# Patient Record
Sex: MALE | Race: WHITE | Employment: STUDENT | URBAN - METROPOLITAN AREA
[De-identification: names, ages, dates, MRNs, and addresses within clinical notes are randomized per-mention and may not be internally consistent; named-entity substitution may affect disease eponyms.]

---

## 2024-07-19 ENCOUNTER — ANESTHESIA EVENT (OUTPATIENT)
Dept: ENDOSCOPY | Age: 49
End: 2024-07-19
Payer: COMMERCIAL

## 2024-07-22 ENCOUNTER — ANESTHESIA (OUTPATIENT)
Dept: ENDOSCOPY | Age: 49
End: 2024-07-22
Payer: COMMERCIAL

## 2024-07-22 ENCOUNTER — HOSPITAL ENCOUNTER (OUTPATIENT)
Age: 49
Setting detail: OUTPATIENT SURGERY
Discharge: HOME | End: 2024-07-22
Attending: INTERNAL MEDICINE | Admitting: INTERNAL MEDICINE
Payer: COMMERCIAL

## 2024-07-22 VITALS
BODY MASS INDEX: 27.43 KG/M2 | OXYGEN SATURATION: 96 % | HEART RATE: 60 BPM | SYSTOLIC BLOOD PRESSURE: 141 MMHG | DIASTOLIC BLOOD PRESSURE: 95 MMHG | WEIGHT: 207 LBS | RESPIRATION RATE: 16 BRPM | HEIGHT: 73 IN | TEMPERATURE: 97.3 F

## 2024-07-22 DIAGNOSIS — Z12.11 SPECIAL SCREENING FOR MALIGNANT NEOPLASMS, COLON: ICD-10-CM

## 2024-07-22 PROCEDURE — 3700000000 HC ANESTHESIA ATTENDED CARE: Performed by: INTERNAL MEDICINE

## 2024-07-22 PROCEDURE — 7100000010 HC PHASE II RECOVERY - FIRST 15 MIN: Performed by: INTERNAL MEDICINE

## 2024-07-22 PROCEDURE — 3700000001 HC ADD 15 MINUTES (ANESTHESIA): Performed by: INTERNAL MEDICINE

## 2024-07-22 PROCEDURE — 2580000003 HC RX 258: Performed by: ANESTHESIOLOGY

## 2024-07-22 PROCEDURE — 6360000002 HC RX W HCPCS: Performed by: NURSE ANESTHETIST, CERTIFIED REGISTERED

## 2024-07-22 PROCEDURE — 88305 TISSUE EXAM BY PATHOLOGIST: CPT

## 2024-07-22 PROCEDURE — 2709999900 HC NON-CHARGEABLE SUPPLY: Performed by: INTERNAL MEDICINE

## 2024-07-22 PROCEDURE — 2500000003 HC RX 250 WO HCPCS: Performed by: NURSE ANESTHETIST, CERTIFIED REGISTERED

## 2024-07-22 PROCEDURE — 3609010600 HC COLONOSCOPY POLYPECTOMY SNARE/COLD BIOPSY: Performed by: INTERNAL MEDICINE

## 2024-07-22 PROCEDURE — 7100000011 HC PHASE II RECOVERY - ADDTL 15 MIN: Performed by: INTERNAL MEDICINE

## 2024-07-22 RX ORDER — OXYCODONE HYDROCHLORIDE 5 MG/1
5 TABLET ORAL PRN
Status: CANCELLED | OUTPATIENT
Start: 2024-07-22 | End: 2024-07-22

## 2024-07-22 RX ORDER — MEPERIDINE HYDROCHLORIDE 25 MG/ML
12.5 INJECTION INTRAMUSCULAR; INTRAVENOUS; SUBCUTANEOUS EVERY 5 MIN PRN
Status: CANCELLED | OUTPATIENT
Start: 2024-07-22

## 2024-07-22 RX ORDER — PROPOFOL 10 MG/ML
INJECTION, EMULSION INTRAVENOUS PRN
Status: DISCONTINUED | OUTPATIENT
Start: 2024-07-22 | End: 2024-07-22 | Stop reason: SDUPTHER

## 2024-07-22 RX ORDER — ONDANSETRON 2 MG/ML
4 INJECTION INTRAMUSCULAR; INTRAVENOUS
Status: CANCELLED | OUTPATIENT
Start: 2024-07-22

## 2024-07-22 RX ORDER — SODIUM CHLORIDE 9 MG/ML
INJECTION, SOLUTION INTRAVENOUS PRN
Status: CANCELLED | OUTPATIENT
Start: 2024-07-22

## 2024-07-22 RX ORDER — LIDOCAINE HYDROCHLORIDE 10 MG/ML
0.3 INJECTION, SOLUTION EPIDURAL; INFILTRATION; INTRACAUDAL; PERINEURAL
Status: DISCONTINUED | OUTPATIENT
Start: 2024-07-22 | End: 2024-07-22 | Stop reason: HOSPADM

## 2024-07-22 RX ORDER — SODIUM CHLORIDE 0.9 % (FLUSH) 0.9 %
5-40 SYRINGE (ML) INJECTION EVERY 12 HOURS SCHEDULED
Status: DISCONTINUED | OUTPATIENT
Start: 2024-07-22 | End: 2024-07-22 | Stop reason: HOSPADM

## 2024-07-22 RX ORDER — CETIRIZINE HYDROCHLORIDE 10 MG/1
10 TABLET ORAL DAILY
COMMUNITY

## 2024-07-22 RX ORDER — NALOXONE HYDROCHLORIDE 0.4 MG/ML
INJECTION, SOLUTION INTRAMUSCULAR; INTRAVENOUS; SUBCUTANEOUS PRN
Status: CANCELLED | OUTPATIENT
Start: 2024-07-22

## 2024-07-22 RX ORDER — SODIUM CHLORIDE 0.9 % (FLUSH) 0.9 %
5-40 SYRINGE (ML) INJECTION EVERY 12 HOURS SCHEDULED
Status: CANCELLED | OUTPATIENT
Start: 2024-07-22

## 2024-07-22 RX ORDER — DIPHENHYDRAMINE HYDROCHLORIDE 50 MG/ML
12.5 INJECTION INTRAMUSCULAR; INTRAVENOUS
Status: CANCELLED | OUTPATIENT
Start: 2024-07-22 | End: 2024-07-23

## 2024-07-22 RX ORDER — LIDOCAINE HYDROCHLORIDE 20 MG/ML
INJECTION, SOLUTION INFILTRATION; PERINEURAL PRN
Status: DISCONTINUED | OUTPATIENT
Start: 2024-07-22 | End: 2024-07-22 | Stop reason: SDUPTHER

## 2024-07-22 RX ORDER — OXYCODONE HYDROCHLORIDE 5 MG/1
10 TABLET ORAL PRN
Status: CANCELLED | OUTPATIENT
Start: 2024-07-22 | End: 2024-07-22

## 2024-07-22 RX ORDER — SODIUM CHLORIDE 9 MG/ML
INJECTION, SOLUTION INTRAVENOUS PRN
Status: DISCONTINUED | OUTPATIENT
Start: 2024-07-22 | End: 2024-07-22 | Stop reason: HOSPADM

## 2024-07-22 RX ORDER — SODIUM CHLORIDE 0.9 % (FLUSH) 0.9 %
5-40 SYRINGE (ML) INJECTION PRN
Status: CANCELLED | OUTPATIENT
Start: 2024-07-22

## 2024-07-22 RX ORDER — SODIUM CHLORIDE, SODIUM LACTATE, POTASSIUM CHLORIDE, CALCIUM CHLORIDE 600; 310; 30; 20 MG/100ML; MG/100ML; MG/100ML; MG/100ML
INJECTION, SOLUTION INTRAVENOUS CONTINUOUS
Status: DISCONTINUED | OUTPATIENT
Start: 2024-07-22 | End: 2024-07-22 | Stop reason: HOSPADM

## 2024-07-22 RX ORDER — SODIUM CHLORIDE 0.9 % (FLUSH) 0.9 %
5-40 SYRINGE (ML) INJECTION PRN
Status: DISCONTINUED | OUTPATIENT
Start: 2024-07-22 | End: 2024-07-22 | Stop reason: HOSPADM

## 2024-07-22 RX ORDER — LABETALOL HYDROCHLORIDE 5 MG/ML
5 INJECTION, SOLUTION INTRAVENOUS EVERY 10 MIN PRN
Status: CANCELLED | OUTPATIENT
Start: 2024-07-22

## 2024-07-22 RX ADMIN — LIDOCAINE HYDROCHLORIDE 100 MG: 20 INJECTION, SOLUTION INFILTRATION; PERINEURAL at 08:46

## 2024-07-22 RX ADMIN — SODIUM CHLORIDE, POTASSIUM CHLORIDE, SODIUM LACTATE AND CALCIUM CHLORIDE: 600; 310; 30; 20 INJECTION, SOLUTION INTRAVENOUS at 08:46

## 2024-07-22 RX ADMIN — PROPOFOL 400 MG: 10 INJECTION, EMULSION INTRAVENOUS at 08:46

## 2024-07-22 NOTE — ANESTHESIA POSTPROCEDURE EVALUATION
Department of Anesthesiology  Postprocedure Note    Patient: Duran Bales  MRN: [id number]  YOB: 1975  Date of evaluation: 7/22/2024    Procedure Summary       Date: 07/22/24 Room / Location: [address]    Anesthesia Start: 0844 Anesthesia Stop: 0915    Procedure: COLONOSCOPY POLYPECTOMY SNARE/BIOPSY Diagnosis:       Special screening for malignant neoplasms, colon      (Special screening for malignant neoplasms, colon [Z12.11])    Surgeons: Sury Scott MD Responsible Provider: Linda Latham MD    Anesthesia Type: TIVA ASA Status: 2            Anesthesia Type: No value filed.    Livier Phase I: Livier Score: 10    Livier Phase II: Livier Score: 10    Anesthesia Post Evaluation    Comments: Postoperative Anesthesia Note    Name:    Duran Bales  MRN:      [id number]    Patient Vitals in the past 12 hrs:  07/22/24 0941, BP:(!) 141/95, Pulse:60, Resp:16, SpO2:96 %  07/22/24 0936, BP:(!) 135/96, Temp:97.3 °F (36.3 °C), Temp src:Temporal, Pulse:62, Resp:18, SpO2:96 %  07/22/24 0930, BP:(!) 135/95, Pulse:87, Resp:16, SpO2:97 %  07/22/24 0917, BP:128/87, Temp:97.5 °F (36.4 °C), Temp src:Temporal, Pulse:72, Resp:16, SpO2:97 %  07/22/24 0739, BP:(!) 137/98, Temp:97.8 °F (36.6 °C), Temp src:Temporal, Pulse:68, Resp:16, SpO2:97 %     LABS:    CBC  No results found for: \"WBC\", \"HGB\", \"HCT\", \"PLT\"  RENAL  No results found for: \"NA\", \"K\", \"CL\", \"CO2\", \"BUN\", \"CREATININE\", \"GLUCOSE\"  COAGS  No results found for: \"PROTIME\", \"INR\", \"APTT\"    Intake & Output:  @24HRIO@    Nausea & Vomiting:  No    Level of Consciousness:  Awake    Pain Assessment:  Adequate analgesia    Anesthesia Complications:  No apparent anesthetic complications    SUMMARY      Vital signs stable  OK to discharge from Stage I post anesthesia care.  Care transferred from Anesthesiology department on discharge from perioperative area     No notable events documented.

## 2024-07-22 NOTE — H&P
Gastroenterology Note             Pre-operative History and Physical    Patient: Duran Bales  : 1975  CSN:     History Obtained From:  patient and/or guardian.     HISTORY OF PRESENT ILLNESS:    The patient is a 48 y.o. male  here for colonoscopy.     Past Medical History:    Past Medical History:   Diagnosis Date    History of hemorrhoids     Hyperlipidemia     Hypertension      Past Surgical History:    History reviewed. No pertinent surgical history.  Medications Prior to Admission:   No current facility-administered medications on file prior to encounter.     Current Outpatient Medications on File Prior to Encounter   Medication Sig Dispense Refill    cetirizine (ZYRTEC) 10 MG tablet Take 1 tablet by mouth daily      cyclobenzaprine (FLEXERIL) 10 MG tablet Take 10 mg by mouth 3 times daily as needed.   (Patient not taking: Reported on 2024)      amlodipine-benazepril (LOTREL) 5-10 MG per capsule Take 1 capsule by mouth daily.   (Patient not taking: Reported on 2024)          Allergies:  Patient has no known allergies.      Social History:   Social History     Tobacco Use    Smoking status: Former     Types: Cigarettes    Smokeless tobacco: Former     Types: Snuff   Substance Use Topics    Alcohol use: Not Currently     Comment: couple daily     Family History:   History reviewed. No pertinent family history.    PHYSICAL EXAM:      BP (!) 137/98   Pulse 68   Temp 97.8 °F (36.6 °C) (Temporal)   Resp 16   Ht 1.854 m (6' 1\")   Wt 93.9 kg (207 lb)   SpO2 97%   BMI 27.31 kg/m²  I        Heart:   RRR, normal s1s2    Lungs:  CTA bilat,  Normal effort    Abdomen:   NT, ND      ASA Grade:  ASA 2 - Patient with mild systemic disease with no functional limitations    Mallampati Class: 2          ASSESSMENT AND PLAN:    1.  Patient is a 48 y.o. male here for Colonoscopy with MAC.   2.  Procedure options, risks and benefits reviewed with patient.  Patient expresses understanding.    Suyr

## 2024-07-22 NOTE — PROGRESS NOTES
.1.  Do not eat or drink anything after 12 midnight prior to surgery.  This includes no water, chewing gum or mints, except for bowel prep complete per MD.  2.  Take the following pills with a small sip of water on the morning of surgery.  3.  Aspirin, Ibuprofen, Advil, Naproxen, Vitamin E and other Anti-inflammatory products should be stopped for 5 days before surgery or as directed by your physician.  4.  Check with your doctor regarding stopping Plavix, Coumadin, Lovenox, Fragmin or other blood thinners.  5.  Do not smoke and do not drink alcoholic beverages 24 hours prior to surgery.  This includes NA Beer.  6.  You may brush your teeth and gargle the morning of surgery.  DO NOT SWALLOW WATER.  7.  You MUST make arrangements for a responsible adult to take you home after your surgery.  You will not be allowed to leave alone or drive yourself home.  It is strongly suggested someone stay with you the first 24 hours.  Your surgery will be cancelled if you do not have a ride home.  8.  A parent/legal guardian must accompany a child scheduled for surgery and plan to stay at the hospital until the child is discharged.  Please do not bring other children with you.  9.  Please wear simple, loose fitting clothing to the hospital.  Do not bring valuables ( money, credit cards, checkbooks, etc.)  Do not wear any makeup (including no eye makeup) or nail polish on your fingers or toes.  10.  Do not wear any jewelry or piercing on the day of surgery.  All body piercing jewelry must be removed.  11.  If you have dentures, they will be removed before going to the OR; we will provide you a container.  If you wear contact lenses or glasses, they will be removed; please bring a case for them.  12.  Notify your Surgeon if you develop any illness between now and surgery time; cough, cold, fever, sore throat, nausea, vomiting, etc.  Please notify your surgeon if you experience dizziness, shortness of breath or blurred vision between 
Patient's IV removed and he is getting dressed at this time with assistance from wife.  
Phase II:  1.  Patient is identified using name and the date of birth.  2.  The patient is free from signs and symptoms of chemical, electrical, laser, radiation, positioning, or transfer/transport injury.  3.  The patient receives appropriate medication(s), safely administered during the Perioperative period.  4.  The patient has wound/tissue perfusion consistent with or improved from baseline levels established preoperatively.  5.  The patient is at or returning to normothermia at the conclusion of the immediate postoperative period.  6.  The patient's fluid, electrolyte, and acid base balances are consistent with or improved from baseline levels established preoperatively.  7.  The patient's pulmonary function is consistent with or improved from baseline levels established preoperatively.  8.  The patient's cardiovascular status is consistent with or improved from baseline levels established preoperatively.  9.  The patient/caregiver demonstrates knowledge of nutritional management related to the operative or other invasive procedure.  10.  The patient/caregiver demonstrates knowledge of medication, pain, and wound management.  11.  The patient participates in the rehabilitation process as applicable.  12.  The patient/caregiver participates in decisions affection his or her Perioperative plan of care.  13.  The patient's care is consistent with the individualized Perioperative plan of care.  14.  The patient's right to privacy is maintained.  15.  The patient is the recipient of competent and ethical care within legal standards of practice.  16.  The patient's value system, lifestyle, ethnicity, and culture are considered, respected, and incorporated in the Perioperative plan of care and understands special services available.  17.  The patient demonstrates and/or reports adequate pain control throughout the the Perioperative period.  18.  The patient's neurological status is consistent with or improved from 
Pre-Operative:  1.  Patient/Caregiver identifies - states name and date of birth.  2.  The patient is free from signs and symptoms of injury.  3.  The patient receives appropriate medication(s), safely administered during the Perioperative period.  4.  The patients's fluid, electrolyte, and acid-base balances are established preoperatively.  5.  The patient's pulmonary function is established preoperatively.  6.  The patient's cardiovascular status is established preoperatively.  7.  The patient / caregiver demonstrates knowledge of nutritional management related to the operative or other invasive procedure.  8.  The patient/caregiver demonstrates knowledge of medication management.  9.  The patient/caregiver demonstrates knowledge of pain management.  10.  The patient/caregiver participates in decisions affection his or her Perioperative plan of care.  11.  The patient's care is consistent with the individualized Perioperative plan of care.  12.  The patient's right to privacy is maintained.  13.  The patient is the recipient of competent and ethical care within legal standards of practice.  14.  The patient's value system, lifestyle, ethnicity, and culture are considered, respected, and incorporated in the Perioperative plan of care and understands special services available.  15.  The patient demonstrates and/or reports adequate pain control throughout the the Perioperative period.  16.  The patient's neurological status is established preoperatively.  17.  The patient/caregiver demonstrates knowledge of the expected responses to the endoscopy procedure.  18.  Patient/Caregiver has reduced anxiety.  Interventions- Familiarize with environment and equipment.  19. Patient/Caregiver verbalizes understanding of Phase II and/or Phase I process.  20.  Patient pain level is established preoperatively using age appropriate pain scale.  21.  The patient will move to fall risk upon sedation- during and through the recovery 
phase.  Interventions- orient the patient to the environment, especially the location of the bathroom; provide treaded socks/non-skid footwear; demonstrate and teach back use of the nurse's call system; instruct the patient to call for help before getting out of bed; lock all movable equipment before transferring patient; keep bed in lowest position possible.

## 2024-07-22 NOTE — ANESTHESIA PRE PROCEDURE
Department of Anesthesiology  Preprocedure Note       Name:  Duran Bales   Age:  48 y.o.  :  1975                                          MRN:  [id number]         Date:  2024      Surgeon: Surgeon(s):  Sury Scott MD    Procedure: Procedure(s):  COLON W/ANES. (8:30)    Medications prior to admission:   Prior to Admission medications    Medication Sig Start Date End Date Taking? Authorizing Provider   cetirizine (ZYRTEC) 10 MG tablet Take 1 tablet by mouth daily   Yes To Mckeon MD   cyclobenzaprine (FLEXERIL) 10 MG tablet Take 10 mg by mouth 3 times daily as needed.    Patient not taking: Reported on 2024    To Mckeon MD   amlodipine-benazepril (LOTREL) 5-10 MG per capsule Take 1 capsule by mouth daily.    Patient not taking: Reported on 2024    To Mckeon MD       Current medications:    Current Facility-Administered Medications   Medication Dose Route Frequency Provider Last Rate Last Admin    lidocaine PF 1 % injection 0.3 mL  0.3 mL IntraDERmal Once PRN Linda Latham MD        lactated ringers IV soln infusion   IntraVENous Continuous Linda Latham MD        sodium chloride flush 0.9 % injection 5-40 mL  5-40 mL IntraVENous 2 times per day Linda Latham MD        sodium chloride flush 0.9 % injection 5-40 mL  5-40 mL IntraVENous PRN Linda Latham MD        0.9 % sodium chloride infusion   IntraVENous PRN Linda Latham MD           Allergies:  No Known Allergies    Problem List:  There is no problem list on file for this patient.      Past Medical History:        Diagnosis Date    History of hemorrhoids     Hypertension        Past Surgical History:  History reviewed. No pertinent surgical history.    Social History:    Social History     Tobacco Use    Smoking status: Never    Smokeless tobacco: Never   Substance Use Topics    Alcohol use: Not Currently     Comment: couple daily                                Counseling given: Not

## 2024-07-22 NOTE — DISCHARGE INSTRUCTIONS
PATIENT INSTRUCTIONS  POST-SEDATION    Duran Bales          IMMEDIATELY FOLLOWING PROCEDURE:    Do not drive or operate machinery for the first twenty four hours after surgery.     Do not make any important decisions for twenty four hours after surgery or while taking narcotic pain medications or sedatives.     You should NOT BE LEFT UNATTENDED OR ALONE. A responsible adult should be with you for the rest of the day of your procedure and also during the night for your protection and safety.    You may experience some light headedness. Rest at home with activity as tolerated. You may not need to go to bed, but it is important to rest for the next 24 hours. You should not engage in athletic sports such as basketball, volleyball, jogging, skating, or activities requiring refined motor skills for 24 hours.   If you develop intractable nausea and vomiting or a severe headache please notify your doctor immediately.   You are not expected to have any fever, but if you feel warm, take your temperature. If you have a fever 101 degrees or higher, call your doctor.     If you have had an Endoscopy:   *Eat lightly for your first meal and gradually resume your normal / prescribed diet. DO NOT eat or drink until your gag reflex returns.   *If you have had a colonoscopy, do not expect a normal bowel movement for approximately three days due to the cleansing of the large intestine prior to colonoscopy.    ONCE YOU ARE HOME, IF YOU SHOULD HAVE:  Difficulty in breathing, persistent nausea or vomiting, bleeding you feel is excessive, or pain that is unusual, increased abdominal bloating, or any swelling, fever / chills, call your physician. If you cannot contact your physician, but feel that your signs and symptoms need a physician's attention, go to the Emergency Department.      FOLLOW-UP:    Please follow up with your primary care provider as scheduled or needed.    Dr. Scott's office will call you with the polyp findings.

## (undated) DEVICE — ELECTRODE,RADIOTRANSLUCENT,FOAM,3PK: Brand: MEDLINE

## (undated) DEVICE — ENDO CARRY-ON PROCEDURE KIT INCLUDES SUCTION TUBING, LUBRICANT, GAUZE, BIOHAZARD STICKER, TRANSPORT PAD AND INTERCEPT BEDSIDE KIT.: Brand: ENDO CARRY-ON PROCEDURE KIT

## (undated) DEVICE — TRAP POLYP ETRAP

## (undated) DEVICE — SNARE ENDOSCP AD L240CM LOOP W10MM SHTH DIA2.4MM RND INSUL